# Patient Record
Sex: FEMALE | Race: WHITE
[De-identification: names, ages, dates, MRNs, and addresses within clinical notes are randomized per-mention and may not be internally consistent; named-entity substitution may affect disease eponyms.]

---

## 2017-09-06 ENCOUNTER — HOSPITAL ENCOUNTER (OUTPATIENT)
Dept: HOSPITAL 45 - C.RAD1850 | Age: 52
Discharge: HOME | End: 2017-09-06
Attending: INTERNAL MEDICINE
Payer: COMMERCIAL
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
## 2017-09-06 DIAGNOSIS — S69.92XA: Primary | ICD-10-CM

## 2017-09-06 NOTE — DIAGNOSTIC IMAGING REPORT
LEFT HAND 3 VIEWS



HISTORY:      S69.92XA Injury of left ring finger [id number]



COMPARISON: None.



FINDINGS: There is no fracture or dislocation. Mild soft tissue swelling at the

PIP joint of the ring finger. No radiopaque foreign bodies.



IMPRESSION:  

No fracture or dislocation within the left hand.







Electronically signed by:  Aidan Patterson M.D.

9/6/2017 12:14 PM



Dictated Date/Time:  9/6/2017 12:12 PM

## 2017-09-26 ENCOUNTER — HOSPITAL ENCOUNTER (OUTPATIENT)
Dept: HOSPITAL 45 - C.LAB1850 | Age: 52
Discharge: HOME | End: 2017-09-26
Attending: OBSTETRICS & GYNECOLOGY
Payer: COMMERCIAL

## 2017-09-26 DIAGNOSIS — N95.1: Primary | ICD-10-CM

## 2017-09-29 ENCOUNTER — HOSPITAL ENCOUNTER (OUTPATIENT)
Dept: HOSPITAL 45 - C.ULTR | Age: 52
Discharge: HOME | End: 2017-09-29
Attending: OBSTETRICS & GYNECOLOGY
Payer: COMMERCIAL

## 2017-09-29 DIAGNOSIS — E04.1: Primary | ICD-10-CM

## 2017-09-29 NOTE — DIAGNOSTIC IMAGING REPORT
THYROID ULTRASOUND



HISTORY:      E04.1 Thyroid cyst [id number].



COMPARISON:  None.



FINDINGS:



Right lobe: 4.9 x 1.6 x 1.4 cm. No nodules.



Left lobe: 4.5 x 1.3 x 1.5 cm. No nodules.



Isthmus: 3 mm in thickness. No nodules.



Miscellaneous: A a small lymph node adjacent to the left carotid artery which

measure subcentimeter in short axis diameter. Therefore, this does not meet

sonographic criteria for pathology.



IMPRESSION:  

Normal thyroid ultrasound. 







Electronically signed by:  Aidan Patterson M.D.

9/29/2017 10:53 AM



Dictated Date/Time:  9/29/2017 10:50 AM

## 2017-12-06 ENCOUNTER — HOSPITAL ENCOUNTER (OUTPATIENT)
Dept: HOSPITAL 45 - C.MAMM | Age: 52
Discharge: HOME | End: 2017-12-06
Attending: OBSTETRICS & GYNECOLOGY
Payer: COMMERCIAL

## 2017-12-06 DIAGNOSIS — Z12.31: Primary | ICD-10-CM

## 2017-12-06 NOTE — MAMMOGRAPHY REPORT
BILATERAL DIGITAL SCREENING MAMMOGRAM TOMOSYNTHESIS WITH CAD: 12/6/2017

CLINICAL HISTORY: Routine screening.  Patient has no complaints.  





TECHNIQUE:  Breast tomosynthesis in addition to standard 2D mammography was performed. Current study 
was also evaluated with a Computer Aided Detection (CAD) system.  



COMPARISON: Comparison is made to exams dated:  12/2/2016 mammogram, 11/30/2015 mammogram, 11/26/2014
 mammogram, 11/8/2013 mammogram, 11/6/2012 mammogram, and 9/23/2011 mammogram - [facility].   



BREAST COMPOSITION:  There are scattered areas of fibroglandular density in both breasts.  



FINDINGS:  No suspicious masses, calcifications, or areas of architectural distortion are noted in ei
ther breast. There has been no significant interval change compared to prior exams.  Again noted are 
post surgical changes in the right upper outer quadrant from prior benign surgical excision.  Loosely
 grouped benign-appearing calcifications in the right upper outer quadrant are stable and likely repr
esent dermal calcifications.  Bilateral asymmetries are stable compared to prior exams.



IMPRESSION:  ACR BI-RADS CATEGORY 2: BENIGN

There is no mammographic evidence of malignancy. A 1 year screening mammogram is recommended.  The pa
tient will receive written notification of the results.  





Approximately 10% of breast cancers are not detected with mammography. A negative mammographic report
 should not delay biopsy if a clinically suggestive mass is present.



Margaux Grant M.D.          

/:12/6/2017 12:18:41  



Imaging Technologist: Karen QUINN(MARK)(M), [facility]

letter sent: Normal 1/2  

BI-RADS Code: ACR BI-RADS Category 2: Benign

## 2018-08-04 ENCOUNTER — HOSPITAL ENCOUNTER (OUTPATIENT)
Dept: HOSPITAL 45 - C.LAB1850 | Age: 53
Discharge: HOME | End: 2018-08-04
Attending: INTERNAL MEDICINE
Payer: COMMERCIAL

## 2018-08-04 DIAGNOSIS — J34.3: ICD-10-CM

## 2018-08-04 DIAGNOSIS — R53.83: Primary | ICD-10-CM

## 2018-08-04 DIAGNOSIS — E55.9: ICD-10-CM

## 2018-08-04 LAB
ALBUMIN SERPL-MCNC: 3.6 GM/DL (ref 3.4–5)
ALP SERPL-CCNC: 56 U/L (ref 45–117)
ALT SERPL-CCNC: 22 U/L (ref 12–78)
AST SERPL-CCNC: 13 U/L (ref 15–37)
BUN SERPL-MCNC: 11 MG/DL (ref 7–18)
CALCIUM SERPL-MCNC: 8.4 MG/DL (ref 8.5–10.1)
CO2 SERPL-SCNC: 28 MMOL/L (ref 21–32)
CREAT SERPL-MCNC: 0.62 MG/DL (ref 0.6–1.2)
GLUCOSE SERPL-MCNC: 81 MG/DL (ref 70–99)
KETONES UR QL STRIP: 56 MG/DL
PH UR: 130 MG/DL (ref 0–200)
POTASSIUM SERPL-SCNC: 4.5 MMOL/L (ref 3.5–5.1)
PROT SERPL-MCNC: 6.4 GM/DL (ref 6.4–8.2)
SODIUM SERPL-SCNC: 142 MMOL/L (ref 136–145)